# Patient Record
Sex: MALE | Race: WHITE | NOT HISPANIC OR LATINO | URBAN - METROPOLITAN AREA
[De-identification: names, ages, dates, MRNs, and addresses within clinical notes are randomized per-mention and may not be internally consistent; named-entity substitution may affect disease eponyms.]

---

## 2022-11-11 ENCOUNTER — EMERGENCY (EMERGENCY)
Facility: HOSPITAL | Age: 52
LOS: 1 days | Discharge: HOME | End: 2022-11-11
Attending: EMERGENCY MEDICINE
Payer: COMMERCIAL

## 2022-11-11 VITALS — TEMPERATURE: 99 F | RESPIRATION RATE: 17 BRPM | OXYGEN SATURATION: 99 % | HEART RATE: 86 BPM

## 2022-11-11 VITALS
HEIGHT: 63 IN | HEART RATE: 102 BPM | RESPIRATION RATE: 18 BRPM | DIASTOLIC BLOOD PRESSURE: 101 MMHG | WEIGHT: 153 LBS | TEMPERATURE: 98 F | SYSTOLIC BLOOD PRESSURE: 169 MMHG | OXYGEN SATURATION: 99 %

## 2022-11-11 LAB
ALBUMIN SERPL ELPH-MCNC: 5 G/DL — SIGNIFICANT CHANGE UP (ref 3.3–5)
ALP SERPL-CCNC: 74 U/L — SIGNIFICANT CHANGE UP (ref 40–120)
ALT FLD-CCNC: 24 U/L — SIGNIFICANT CHANGE UP (ref 10–45)
ANION GAP SERPL CALC-SCNC: 13 MMOL/L — SIGNIFICANT CHANGE UP (ref 5–17)
AST SERPL-CCNC: 22 U/L — SIGNIFICANT CHANGE UP (ref 10–40)
BASOPHILS # BLD AUTO: 0.04 K/UL — SIGNIFICANT CHANGE UP (ref 0–0.2)
BASOPHILS NFR BLD AUTO: 0.5 % — SIGNIFICANT CHANGE UP (ref 0–2)
BILIRUB SERPL-MCNC: 0.5 MG/DL — SIGNIFICANT CHANGE UP (ref 0.2–1.2)
BUN SERPL-MCNC: 9 MG/DL — SIGNIFICANT CHANGE UP (ref 7–23)
CALCIUM SERPL-MCNC: 9.6 MG/DL — SIGNIFICANT CHANGE UP (ref 8.4–10.5)
CHLORIDE SERPL-SCNC: 103 MMOL/L — SIGNIFICANT CHANGE UP (ref 96–108)
CO2 SERPL-SCNC: 23 MMOL/L — SIGNIFICANT CHANGE UP (ref 22–31)
CREAT SERPL-MCNC: 1.05 MG/DL — SIGNIFICANT CHANGE UP (ref 0.5–1.3)
EGFR: 84 ML/MIN/1.73M2 — SIGNIFICANT CHANGE UP
EOSINOPHIL # BLD AUTO: 0.1 K/UL — SIGNIFICANT CHANGE UP (ref 0–0.5)
EOSINOPHIL NFR BLD AUTO: 1.3 % — SIGNIFICANT CHANGE UP (ref 0–6)
GLUCOSE SERPL-MCNC: 103 MG/DL — HIGH (ref 70–99)
HCT VFR BLD CALC: 47.1 % — SIGNIFICANT CHANGE UP (ref 39–50)
HGB BLD-MCNC: 16.9 G/DL — SIGNIFICANT CHANGE UP (ref 13–17)
IMM GRANULOCYTES NFR BLD AUTO: 0.6 % — SIGNIFICANT CHANGE UP (ref 0–0.9)
LYMPHOCYTES # BLD AUTO: 1.37 K/UL — SIGNIFICANT CHANGE UP (ref 1–3.3)
LYMPHOCYTES # BLD AUTO: 17.8 % — SIGNIFICANT CHANGE UP (ref 13–44)
MCHC RBC-ENTMCNC: 31.3 PG — SIGNIFICANT CHANGE UP (ref 27–34)
MCHC RBC-ENTMCNC: 35.9 GM/DL — SIGNIFICANT CHANGE UP (ref 32–36)
MCV RBC AUTO: 87.2 FL — SIGNIFICANT CHANGE UP (ref 80–100)
MONOCYTES # BLD AUTO: 0.32 K/UL — SIGNIFICANT CHANGE UP (ref 0–0.9)
MONOCYTES NFR BLD AUTO: 4.2 % — SIGNIFICANT CHANGE UP (ref 2–14)
NEUTROPHILS # BLD AUTO: 5.83 K/UL — SIGNIFICANT CHANGE UP (ref 1.8–7.4)
NEUTROPHILS NFR BLD AUTO: 75.6 % — SIGNIFICANT CHANGE UP (ref 43–77)
NRBC # BLD: 0 /100 WBCS — SIGNIFICANT CHANGE UP (ref 0–0)
PLATELET # BLD AUTO: 176 K/UL — SIGNIFICANT CHANGE UP (ref 150–400)
POTASSIUM SERPL-MCNC: 4.4 MMOL/L — SIGNIFICANT CHANGE UP (ref 3.5–5.3)
POTASSIUM SERPL-SCNC: 4.4 MMOL/L — SIGNIFICANT CHANGE UP (ref 3.5–5.3)
PROT SERPL-MCNC: 7.9 G/DL — SIGNIFICANT CHANGE UP (ref 6–8.3)
RBC # BLD: 5.4 M/UL — SIGNIFICANT CHANGE UP (ref 4.2–5.8)
RBC # FLD: 13.2 % — SIGNIFICANT CHANGE UP (ref 10.3–14.5)
SODIUM SERPL-SCNC: 139 MMOL/L — SIGNIFICANT CHANGE UP (ref 135–145)
WBC # BLD: 7.71 K/UL — SIGNIFICANT CHANGE UP (ref 3.8–10.5)
WBC # FLD AUTO: 7.71 K/UL — SIGNIFICANT CHANGE UP (ref 3.8–10.5)

## 2022-11-11 PROCEDURE — 96365 THER/PROPH/DIAG IV INF INIT: CPT | Mod: XU

## 2022-11-11 PROCEDURE — 96376 TX/PRO/DX INJ SAME DRUG ADON: CPT | Mod: XU

## 2022-11-11 PROCEDURE — 85025 COMPLETE CBC W/AUTO DIFF WBC: CPT

## 2022-11-11 PROCEDURE — 80053 COMPREHEN METABOLIC PANEL: CPT

## 2022-11-11 PROCEDURE — 96375 TX/PRO/DX INJ NEW DRUG ADDON: CPT | Mod: XU

## 2022-11-11 PROCEDURE — 99284 EMERGENCY DEPT VISIT MOD MDM: CPT | Mod: 25

## 2022-11-11 PROCEDURE — 70481 CT ORBIT/EAR/FOSSA W/DYE: CPT | Mod: 26,MA

## 2022-11-11 PROCEDURE — 36415 COLL VENOUS BLD VENIPUNCTURE: CPT

## 2022-11-11 PROCEDURE — 70481 CT ORBIT/EAR/FOSSA W/DYE: CPT | Mod: MA

## 2022-11-11 PROCEDURE — 99220: CPT

## 2022-11-11 PROCEDURE — G0378: CPT

## 2022-11-11 RX ORDER — DIPHENHYDRAMINE HCL 50 MG
25 CAPSULE ORAL EVERY 6 HOURS
Refills: 0 | Status: DISCONTINUED | OUTPATIENT
Start: 2022-11-11 | End: 2022-11-14

## 2022-11-11 RX ORDER — DIPHENHYDRAMINE HCL 50 MG
25 CAPSULE ORAL EVERY 6 HOURS
Refills: 0 | Status: DISCONTINUED | OUTPATIENT
Start: 2022-11-11 | End: 2022-11-11

## 2022-11-11 RX ORDER — AMPICILLIN SODIUM AND SULBACTAM SODIUM 250; 125 MG/ML; MG/ML
3 INJECTION, POWDER, FOR SUSPENSION INTRAMUSCULAR; INTRAVENOUS EVERY 6 HOURS
Refills: 0 | Status: DISCONTINUED | OUTPATIENT
Start: 2022-11-11 | End: 2022-11-14

## 2022-11-11 RX ORDER — AMPICILLIN SODIUM AND SULBACTAM SODIUM 250; 125 MG/ML; MG/ML
3 INJECTION, POWDER, FOR SUSPENSION INTRAMUSCULAR; INTRAVENOUS ONCE
Refills: 0 | Status: COMPLETED | OUTPATIENT
Start: 2022-11-11 | End: 2022-11-11

## 2022-11-11 RX ADMIN — Medication 25 MILLIGRAM(S): at 15:53

## 2022-11-11 RX ADMIN — AMPICILLIN SODIUM AND SULBACTAM SODIUM 200 GRAM(S): 250; 125 INJECTION, POWDER, FOR SUSPENSION INTRAMUSCULAR; INTRAVENOUS at 19:17

## 2022-11-11 RX ADMIN — Medication 25 MILLIGRAM(S): at 16:51

## 2022-11-11 RX ADMIN — AMPICILLIN SODIUM AND SULBACTAM SODIUM 3 GRAM(S): 250; 125 INJECTION, POWDER, FOR SUSPENSION INTRAMUSCULAR; INTRAVENOUS at 19:55

## 2022-11-11 RX ADMIN — AMPICILLIN SODIUM AND SULBACTAM SODIUM 200 GRAM(S): 250; 125 INJECTION, POWDER, FOR SUSPENSION INTRAMUSCULAR; INTRAVENOUS at 11:45

## 2022-11-11 NOTE — ED PROVIDER NOTE - OBJECTIVE STATEMENT
Attending note.  Patient was seen in pediatric [facility].  Patient complains of erythema and swelling about the left thigh.  Patient had procedure for chalazion in the left lower lid approximately 15 days ago.  Patient reports it being opened and drained.  Patient was put on Keflex initially.  He subsequently was changed to Bactrim DS for which she has been taking twice a day.  He denies any significant pain.  Denies any change in his vision.  He has no headache.  He reports no significant past medical history, takes no medications and has no allergies.  Patient reports waking up this morning having significantly more swelling and redness about the left upper and lower lids and orbit. Attending note.  Patient was seen in [facility].  Patient complains of erythema and swelling about the left eye.  Patient had procedure for chalazion in the left lower lid approximately 15 days ago.  Patient reports it being opened and drained.  Patient was put on Keflex initially.  He subsequently was changed to Bactrim DS for which she has been taking twice a day.  He denies any significant pain.  Denies any change in his vision.  He has no headache.  He reports no significant past medical history, takes no medications and has no allergies.  Patient reports waking up this morning having significantly more swelling and redness about the left upper and lower lids and orbit.

## 2022-11-11 NOTE — ED CDU PROVIDER DISPOSITION NOTE - NSFOLLOWUPINSTRUCTIONS_ED_ALL_ED_FT
1. Stay hydrated. Elevate extremity with overlying infection.  2. Continue current home medications  3. Take clindamycin Augmentin 1 tab 2x/day for 10 days.  Start probiotic as instructed.  4. Follow up with your PCP in 1-2 days (Bring Printed results to your doctor visit)  5. Return if symptoms worsen, fever, weakness, spreading redness and all other concerns 1. Stay hydrated. put the eye drops : polymixin drops on your allergy list, avoid in future.   2. Take Benadryl 25mg every 4-6hrs for itching and rash as needed.   3. Take Augmentin 1 tab 2x/day for 10 days.  Start probiotic as instructed.  4. Follow up with your PCP or Medicine clinic [phone] in 1-2 days. Follow up with your Ophthalmologist next week. (Bring Printed results to your doctor visit)  5. Return if symptoms worsen, fever, weakness, spreading redness and all other concerns      **your blood pressure was very high here, please follow this up with your primary care doctor       Allergies    WHAT YOU NEED TO KNOW:    Allergies are an immune system reaction to a substance called an allergen. Your immune system sees the allergen as harmful and attacks it. An allergic reaction can be mild or life-threatening. A life-threatening reaction is called anaphylaxis. Anaphylaxis is a sudden, life-threatening reaction that needs immediate treatment.    DISCHARGE INSTRUCTIONS:    Call 911 for signs or symptoms of anaphylaxis, such as trouble breathing, swelling in your mouth or throat, or wheezing. You may also have itching, a rash, hives, or feel like you are going to faint.    Return to the emergency department if:   •You have tingling in your hands or feet.       •Your skin is red or flushed.       Contact your healthcare provider if:   •You have questions or concerns about your condition or care.          Medicines:   •Antihistamines help decrease itching, sneezing, and swelling. You may take them as a pill or use drops in your nose or eyes.       •Decongestants help your nose feel less stuffy.       •Topical treatments help decrease itching or swelling. You also may be given nasal sprays or eyedrops.       •Epinephrine is used to treat a severe allergic reaction such as anaphylaxis.       •Take your medicine as directed. Contact your healthcare provider if you think your medicine is not helping or if you have side effects. Tell your provider if you are allergic to any medicine. Keep a list of the medicines, vitamins, and herbs you take. Include the amounts, and when and why you take them. Bring the list or the pill bottles to follow-up visits. Carry your medicine list with you in case of an emergency.      Steps to take for signs or symptoms of anaphylaxis:   •Immediately give 1 shot of epinephrine only into the outer thigh muscle.       •Leave the shot in place as directed. Your healthcare provider may recommend you leave it in place for up to 10 seconds before you remove it. This helps make sure all of the epinephrine is delivered.       •Call 911 and go to the emergency department, even if the shot improved symptoms. Do not drive yourself. Bring the used epinephrine shot with you.       Safety precautions to take if you are at risk for anaphylaxis:   •Keep 2 shots of epinephrine with you at all times. You may need a second shot, because epinephrine only works for about 20 minutes and symptoms may return. Your healthcare provider can show you and family members how to give the shot. Check the expiration date every month and replace it before it expires.      •Create an action plan. Your healthcare provider can help you create a written plan that explains the allergy and an emergency plan to treat a reaction. The plan explains when to give a second epinephrine shot if symptoms return or do not improve after the first. Give copies of the action plan and emergency instructions to family members and work staff. Show them how to give a shot of epinephrine.      •Be careful when you exercise. If you have had exercise-induced anaphylaxis, do not exercise right after you eat. Stop exercising right away if you start to develop any signs or symptoms of anaphylaxis. You may first feel tired, warm, or have itchy skin. Hives, swelling, and severe breathing problems may develop if you continue to exercise.      •Carry medical alert identification. Wear medical alert jewelry or carry a card that explains the allergy. Ask your healthcare provider where to get these items.   Medical Alert Jewelry           •Inform all healthcare providers of the allergy. This includes dentists, nurses, doctors, and surgeons.       Manage allergies:   •Use nasal rinses as directed. Rinse with a saline solution daily. This will help clear allergens out of your nose. Use distilled water if possible. You can also boil tap water and let it cool before you use it. Do not use tap water that has not been boiled.      •Do not smoke. Allergy symptoms may decrease if you are not around smoke. Nicotine and other chemicals in cigarettes and cigars can cause lung damage. Ask your healthcare provider for information if you currently smoke and need help to quit. E-cigarettes or smokeless tobacco still contain nicotine. Talk to your healthcare provider before you use these products.       Prevent allergic reactions:   •Do not go outside when pollen counts are high if you have seasonal allergies. Your symptoms may be better if you go outside only in the morning or evening. Use your air conditioner, and change air filters often.       •Avoid dust, fur, and mold. Dust and vacuum your home often. You may want to wear a mask when you vacuum. Keep pets in certain rooms, and bathe them often. Use a dehumidifier (machine that decreases moisture) to help prevent mold.       •Do not use products that contain latex if you have a latex allergy. Use nonlatex gloves if you work in healthcare or in food preparation. Always tell healthcare providers about a latex allergy.       •Avoid areas that attract insects if you have an insect bite or sting allergy. Areas include trash cans, gardens, and picnics. Do not wear bright clothing or strong scents when you will be outside.      •Prevent an allergic reaction caused by food. You may have a reaction if your food is not prepared safely. For example, you could be served food that touched your trigger food during preparation. This is called cross-contamination. Kitchen tools can also cause cross-contamination. You may also eat baked foods that contain a trigger food you do not know about. Ask if the food contains your trigger food before you handle or eat it.      Follow up with your healthcare provider as directed: Write down your questions so you remember to ask them during your visits. When you have an allergic reaction, write down everything you were exposed to in the 2 hours before the reaction. Take that information to your next visit.       © Copyright [employer] 2022           back to top                          © Copyright [employer] 2022             Periorbital Cellulitis    WHAT YOU NEED TO KNOW:    Periorbital cellulitis, or preseptal cellulitis, is a bacterial infection of your eyelid or the skin around your eye. . The infection can develop from a scratch or insect bite around the eye. Periorbital cellulitis may cause vision problems. It should be treated as soon as possible to prevent complications.   Cellulitis of the Eye         DISCHARGE INSTRUCTIONS:    Return to the emergency department if:   •You lose vision in your infected eye.      •You have vision problems, such as double vision.      •You have difficulty moving your eyeball.      •You cannot close your eye due to swelling.      •You develop a headache and are vomiting.      •You have a stiff neck.      •You see red streaks coming from the infected area.      Call your doctor if:   •Your symptoms do not get better within 24 hours of treatment.      •The red, warm, swollen area gets larger.      •Your fever or pain does not go away or gets worse.      •You have questions or concerns about your condition or care.      Medicines: If your periorbital cellulitis is severe, you may need IV antibiotics in the hospital. If the infection is not treated, it can spread through your body and become life-threatening. You may need any of the following medicines:  •Antibiotics help treat a bacterial infection.      •Acetaminophen decreases pain and fever. It is available without a doctor's order. Ask how much to take and how often to take it. Follow directions. Read the labels of all other medicines you are using to see if they also contain acetaminophen, or ask your doctor or pharmacist. Acetaminophen can cause liver damage if not taken correctly.      •NSAIDs, such as ibuprofen, help decrease swelling, pain, and fever. This medicine is available with or without a doctor's order. NSAIDs can cause stomach bleeding or kidney problems in certain people. If you take blood thinner medicine, always ask your healthcare provider if NSAIDs are safe for you. Always read the medicine label and follow directions.      •Take your medicine as directed. Contact your healthcare provider if you think your medicine is not helping or if you have side effects. Tell your provider if you are allergic to any medicine. Keep a list of the medicines, vitamins, and herbs you take. Include the amounts, and when and why you take them. Bring the list or the pill bottles to follow-up visits. Carry your medicine list with you in case of an emergency.      Self-care:   •Wash the area with soap and water every day. Gently pat dry. Use bandages if directed by your healthcare provider.      •Do not rub or scratch your eyes. This can increase your risk for spreading the infection.       •Place a cool, damp cloth on the area. Use clean cloths and clean water. You can do this as often as you need to. Cool, damp cloths may help decrease pain.      •Wash your hands often. Use soap and water. Wash your hands after you use the bathroom, change a child's diapers, or sneeze. Wash your hands before you prepare or eat food. Use lotion to prevent dry, cracked skin.  Handwashing           Prevent another eye infection:   •Wear proper safety equipment. Protect your face from injury during sports and other activities.      •Keep wounds clean and dry. Clean wounds on the face with soap and water. Cover wounds with a dry bandage if needed.       Follow up with your doctor or ophthalmologist as directed: He or she will check if your cellulitis is getting better. Write down your questions so you remember to ask them during your visits.       © Copyright [employer] 2022           back to top                          © Copyright [employer] 2022

## 2022-11-11 NOTE — ED CDU PROVIDER DISPOSITION NOTE - CLINICAL COURSE
· HPI Objective Statement: Patient complains of erythema and swelling around L eye.  Patient had procedure for chalazion in the left lower lid approximately 15 days ago.  Patient reports it being opened and drained.  Patient was put on Keflex initially.  He subsequently was changed to Bactrim DS for which she has been taking twice a day, started yesterday.  Patient reports waking up this morning having significantly more swelling and redness about the left upper and lower lids and orbit, so came in. He denies any pain.  Denies any change in his vision.  He has no headache.  He reports no significant past medical history, takes no medications and has no allergies.   in ED, labs nonactionable, CT orbits significant for preseptal cellulitis, no abscess, pt sent to cdu for IV abx · HPI Objective Statement: Patient complains of erythema and swelling around L eye.  Patient had procedure for chalazion in the left lower lid approximately 15 days ago.  Patient reports it being opened and drained.  Patient was put on Keflex initially.  He subsequently was changed to Bactrim DS for which she has been taking twice a day, started yesterday.  Patient reports waking up this morning having significantly more swelling and redness about the left upper and lower lids and orbit, so came in. He denies any pain.  Denies any change in his vision.  He has no headache.  He reports no significant past medical history, takes no medications and has no allergies.   in ED, labs nonactionable, CT orbits significant for preseptal cellulitis, no abscess, pt sent to cdu for IV abx.  in cdu, pt improved dramatically with IV abx and IV benadryl, chemosis resolved, erythema and swelling improved, pt to go home on oral abx and f/up outpt

## 2022-11-11 NOTE — ED CDU PROVIDER INITIAL DAY NOTE - PROGRESS NOTE DETAILS
CDU NOTE YI Garcia: L eye pruritic and looks like there may be an allergic component, will try benadryl and see how patient feels. CDU NOTE YI Garcia: pt feeling much better, states now he can see out of eye. feels confident the improvement is from the benadryl. NAD. VSS. L eye- chemosis resolved. improved opening of eyelids. erythema still present periorbitally.   will continue benadryl and IV abx.  pt instructed to stop the eye drops that were prescribed to him outpatient and to avoid in the future as he may be allergic. pt understands and agrees. CDU NOTE YI Garcia: pt resting comfortably, feels much better, wants to go home. NAD VSS. L eye- significantly improved, swelling and erythema improved, chemosis resolved.  as per Dr. Thompson, pt stable for d/c home on Augmentin and benadryl CDU NOTE YI Garcia: blood pressure high. discussed with bebeto, per Dr. Thompson, offered patient overnight stay to monitor BPs and L eye, pt declined, wants to go home, states he will definitely follow up with a PCP

## 2022-11-11 NOTE — ED PROVIDER NOTE - CLINICAL SUMMARY MEDICAL DECISION MAKING FREE TEXT BOX
Attending note.  Left periorbital swelling and erythema most likely periorbital cellulitis versus orbital cellulitis.  CT of orbit, labs, IV antibiotics.  Possible CDU

## 2022-11-11 NOTE — ED CDU PROVIDER INITIAL DAY NOTE - PHYSICAL EXAMINATION
Examination of the patient's face reveals some erythema swelling and edema in the left periorbital area.  There is no tenderness to the upper lid or lower lid.  There is some crusting on the eyelashes.  There is no evidence of hordeolum or chalazion.  Sclera and conjunctiva are noninjected.  Pupil is 3 mm equal and reactive.  There is no pain with extraocular muscles or restriction of eye movement.

## 2022-11-11 NOTE — ED CDU PROVIDER INITIAL DAY NOTE - OBJECTIVE STATEMENT
Patient complains of erythema and swelling around L eye.  Patient had procedure for chalazion in the left lower lid approximately 15 days ago.  Patient reports it being opened and drained.  Patient was put on Keflex initially.  He subsequently was changed to Bactrim DS for which she has been taking twice a day, started yesterday.  Patient reports waking up this morning having significantly more swelling and redness about the left upper and lower lids and orbit, so came in. He denies any pain.  Denies any change in his vision.  He has no headache.  He reports no significant past medical history, takes no medications and has no allergies.

## 2022-11-11 NOTE — ED ADULT NURSE NOTE - OBJECTIVE STATEMENT
Pt presented to ed with c/o redness and swelling to LT eye. pt had an I&D procedure done to LT eye area approx. 15 days ago. pt was prescribed with keflex and Bactrim for the symptoms, but no improvement. pt denies nausea, vomiting, fever, chills, sob, chest pain, headache, dizziness, change in vision. Will continue to monitor.

## 2022-11-11 NOTE — ED CDU PROVIDER DISPOSITION NOTE - PATIENT PORTAL LINK FT
You can access the FollowMyHealth Patient Portal offered by [facility] by registering at the following website: [email or web address]. By joining [employer]’s FollowMyHealth portal, you will also be able to view your health information using other applications (apps) compatible with our system.

## 2022-11-11 NOTE — ED ADULT TRIAGE NOTE - CHIEF COMPLAINT QUOTE
s/p sty removal 2 weeks ago  now c/o juan orbital redness and swelling left eye 2 days after the procedure

## 2022-11-11 NOTE — ED PROVIDER NOTE - PHYSICAL EXAMINATION
Attending note.  Patient is alert and in no acute distress.  Examination of the patient's face reveals some erythema swelling and edema in the left periorbital area.  There is no tenderness to the upper lid or lower lid.  There is some crusting on the eyelashes.  There is no evidence of hordeolum or chalazion.  Sclera and conjunctiva are noninjected.  Pupil is 3 mm equal and reactive.  There is no pain with extraocular muscles or restriction of eye movement.

## 2022-11-11 NOTE — ED ADULT NURSE REASSESSMENT NOTE - NS ED NURSE REASSESS COMMENT FT1
Received pt from NITESH Donohue, Pt aox4, no complaints of pain, pt oriented to CDU unit, pt has erythema and swelling around left eye, pt denies any vision changes and headaches, Plan of care discussed with pt, IV abx treatment continued. Comfort and safety maintained, VSS stable, IV catheter intact with no signs of irritation and no complaints of discomfort by pt. will continue with plan of care.

## 2025-06-18 NOTE — ED ADULT NURSE NOTE - WAS YOUR LAST COVID-19 VACCINE GREATER THAN OR EQUAL TO TWO MONTHS AGO?
Yes Chonodrocutaneous Helical Advancement Flap Text: The defect edges were debeveled with a #15 scalpel blade. Given the location of the defect and the proximity to free margins a chondrocutaneous helical advancement flap was deemed most appropriate. Using a sterile surgical marker, the appropriate advancement flap was drawn incorporating the defect and placing the expected incisions within the relaxed skin tension lines where possible. The area thus outlined was incised deep to adipose tissue with a #15 scalpel blade. The skin margins were undermined to an appropriate distance in all directions utilizing iris scissors. Following this, the designed flap was advanced and carried over into the primary defect and sutured into place.